# Patient Record
Sex: MALE | Employment: OTHER | URBAN - METROPOLITAN AREA
[De-identification: names, ages, dates, MRNs, and addresses within clinical notes are randomized per-mention and may not be internally consistent; named-entity substitution may affect disease eponyms.]

---

## 2017-03-15 ENCOUNTER — TELEPHONE (OUTPATIENT)
Dept: PULMONOLOGY | Facility: HOSPICE | Age: 72
End: 2017-03-15

## 2017-03-15 DIAGNOSIS — R06.00 DYSPNEA, UNSPECIFIED TYPE: ICD-10-CM

## 2017-05-08 ENCOUNTER — HOSPITAL ENCOUNTER (OUTPATIENT)
Dept: RADIOLOGY | Facility: MEDICAL CENTER | Age: 72
End: 2017-05-08

## 2017-05-08 ENCOUNTER — TELEPHONE (OUTPATIENT)
Dept: PULMONOLOGY | Facility: HOSPICE | Age: 72
End: 2017-05-08

## 2017-05-08 DIAGNOSIS — J40 BRONCHITIS: ICD-10-CM

## 2017-05-08 NOTE — TELEPHONE ENCOUNTER
New Pulmonary pt mariely 5/11/2017. Last CXR on record was done 10/19/2015 at [facility]( in PACS). Please sign order if exam appropriate for visit. Thank you!

## 2017-05-11 ENCOUNTER — OFFICE VISIT (OUTPATIENT)
Dept: PULMONOLOGY | Facility: HOSPICE | Age: 72
End: 2017-05-11
Payer: MEDICARE

## 2017-05-11 ENCOUNTER — NON-PROVIDER VISIT (OUTPATIENT)
Dept: PULMONOLOGY | Facility: HOSPICE | Age: 72
End: 2017-05-11
Payer: MEDICARE

## 2017-05-11 ENCOUNTER — APPOINTMENT (OUTPATIENT)
Dept: RADIOLOGY | Facility: IMAGING CENTER | Age: 72
End: 2017-05-11
Payer: MEDICARE

## 2017-05-11 VITALS
SYSTOLIC BLOOD PRESSURE: 130 MMHG | HEIGHT: 73 IN | WEIGHT: 200 LBS | DIASTOLIC BLOOD PRESSURE: 82 MMHG | HEART RATE: 66 BPM | TEMPERATURE: 98.4 F | RESPIRATION RATE: 16 BRPM | OXYGEN SATURATION: 92 % | BODY MASS INDEX: 26.51 KG/M2

## 2017-05-11 VITALS — HEIGHT: 73 IN | BODY MASS INDEX: 26.51 KG/M2 | WEIGHT: 200 LBS

## 2017-05-11 DIAGNOSIS — J44.9 CHRONIC OBSTRUCTIVE PULMONARY DISEASE, UNSPECIFIED COPD TYPE (HCC): ICD-10-CM

## 2017-05-11 DIAGNOSIS — J40 BRONCHITIS: ICD-10-CM

## 2017-05-11 DIAGNOSIS — R06.00 DYSPNEA, UNSPECIFIED TYPE: ICD-10-CM

## 2017-05-11 DIAGNOSIS — R09.02 HYPOXIA: ICD-10-CM

## 2017-05-11 PROCEDURE — 99204 OFFICE O/P NEW MOD 45 MIN: CPT | Mod: 25 | Performed by: INTERNAL MEDICINE

## 2017-05-11 PROCEDURE — 71020 DX-CHEST-2 VIEWS: CPT | Mod: 26 | Performed by: INTERNAL MEDICINE

## 2017-05-11 PROCEDURE — 94761 N-INVAS EAR/PLS OXIMETRY MLT: CPT | Performed by: INTERNAL MEDICINE

## 2017-05-11 PROCEDURE — 94060 EVALUATION OF WHEEZING: CPT | Performed by: INTERNAL MEDICINE

## 2017-05-11 PROCEDURE — 94729 DIFFUSING CAPACITY: CPT | Performed by: INTERNAL MEDICINE

## 2017-05-11 PROCEDURE — 1101F PT FALLS ASSESS-DOCD LE1/YR: CPT | Performed by: INTERNAL MEDICINE

## 2017-05-11 PROCEDURE — 94726 PLETHYSMOGRAPHY LUNG VOLUMES: CPT | Performed by: INTERNAL MEDICINE

## 2017-05-11 PROCEDURE — G8419 CALC BMI OUT NRM PARAM NOF/U: HCPCS | Performed by: INTERNAL MEDICINE

## 2017-05-11 RX ORDER — ALBUTEROL SULFATE 90 UG/1
2 AEROSOL, METERED RESPIRATORY (INHALATION) EVERY 4 HOURS PRN
Qty: 1 INHALER | Refills: 4 | Status: SHIPPED | OUTPATIENT
Start: 2017-05-11

## 2017-05-11 RX ORDER — IBUPROFEN 200 MG
200 TABLET ORAL EVERY 6 HOURS PRN
COMMUNITY

## 2017-05-11 RX ORDER — BUDESONIDE AND FORMOTEROL FUMARATE DIHYDRATE 160; 4.5 UG/1; UG/1
2 AEROSOL RESPIRATORY (INHALATION) 2 TIMES DAILY
Qty: 1 INHALER | Refills: 4 | Status: SHIPPED | OUTPATIENT
Start: 2017-05-11

## 2017-05-11 RX ORDER — PETROLATUM,WHITE/LANOLIN
OINTMENT (GRAM) TOPICAL
COMMUNITY

## 2017-05-11 ASSESSMENT — PULMONARY FUNCTION TESTS
FEV1_PREDICTED: 3.62
FEV1: 2.06
FEV1/FVC_PERCENT_PREDICTED: 74
FVC: 4.89
FEV1_PERCENT_CHANGE: 12
FEV1_PERCENT_PREDICTED: 56
FEV1/FVC: 37
FEV1: 1.79
FVC: 5.48
FEV1_PERCENT_PREDICTED: 49
FVC_PERCENT_PREDICTED: 111
FEV1_PERCENT_CHANGE: 14
FEV1/FVC_PERCENT_PREDICTED: 50
FEV1/FVC_PERCENT_CHANGE: 117
FVC_PERCENT_PREDICTED: 99
FEV1/FVC_PERCENT_PREDICTED: 49
FVC_PREDICTED: 4.92
FEV1/FVC: 37.59

## 2017-05-11 ASSESSMENT — PATIENT HEALTH QUESTIONNAIRE - PHQ9: CLINICAL INTERPRETATION OF PHQ2 SCORE: 0

## 2017-05-11 NOTE — PATIENT INSTRUCTIONS
This patient comes in at the request of his primary care specialist, Dr. Bull Carmona in [place] for evaluation of chronic obstructive pulmonary disease. Remarkably he has a very minimal cigarette smoking history, none for 45 years. However he has utilized marijuana, up to 3-4 times per day, indicates he uses it when stressed. This pattern is one of severe obstruction with hyperinflation. He is able to ski downhill, but any activity causes oxygen desaturation and dyspnea and shortness of breath. I suggested he add Symbicort to his program to try to improve the airflow, reduced to hyperinflation and we will measure spirometry at the next visit.    He does have seasonal allergies, no significant cough purulence or hemoptysis. His present oxygen is at 2 L at night, started empirically, I am qualifying him today with a walking oximetry as he lives at 8000 feet. He is considering moving lower.    With his marijuana history, and severe hyperinflation as well as severe airflow obstruction, I have urged him in the future when he develops pain or stress and previously would smoke marijuana, that he utilize oral sources. We will see him in 6 months with spirometry, initiate Symbicort, continue the use of the rescue inhaler, and avoid inhaled marijuana. Qualify for oxygen nighttime as well as exercise, 3 L at 8000 feet    Patient will need a flu vaccine annually and Prevnar this fall

## 2017-05-11 NOTE — PROGRESS NOTES
Shad Morales is a 71 y.o. male here for COPD and hypoxemia. Patient was referred by his primary care doctor.    History of Present Illness:      This patient comes in at the request of his primary care specialist, Dr. Bull Carmona in [place] for evaluation of chronic obstructive pulmonary disease. Remarkably he has a very minimal cigarette smoking history, none for 45 years. However he has utilized marijuana, up to 3-4 times per day, indicates he uses it when stressed. This pattern is one of severe obstruction with hyperinflation. He is able to ski downhill, but any activity causes oxygen desaturation and dyspnea and shortness of breath. I suggested he add Symbicort to his program to try to improve the airflow, reduced to hyperinflation and we will measure spirometry at the next visit.    He does have seasonal allergies, no significant cough purulence or hemoptysis. His present oxygen is at 2 L at night, started empirically, I am qualifying him today with a walking oximetry as he lives at 8000 feet. He is considering moving lower.    With his marijuana history, and severe hyperinflation as well as severe airflow obstruction, I have urged him in the future when he develops pain or stress and previously would smoke marijuana, that he utilize oral sources. We will see him in 6 months with spirometry, initiate Symbicort, continue the use of the rescue inhaler, and avoid inhaled marijuana. Qualify for oxygen nighttime as well as exercise, 3 L at 8000 feet    Constitutional ROS: No unexpected change in weight, No unexplained fevers, sweats, or chills  Eyes: No change in vision or blurring or double vision  Mouth/Throat ROS: No sore throat, No recent change in voice or hoarseness  Pulmonary ROS: See present history for pertinent positives  Cardiovascular ROS: No chest pain  Gastrointestinal ROS: No abdominal pain, No abdominal bloating or early satiety  Musculoskeletal/Extremities ROS: No clubbing, No  "cyanosis  Hematologic/Lymphatic ROS: No abnormal bleeding  Neurologic ROS: No weakness  Psychiatric ROS: No depression  Allergic/Immunologic: No rhinitis or urticaria     Current Outpatient Prescriptions   Medication Sig Dispense Refill   • ibuprofen (ADVIL) 200 MG Tab Take 200 mg by mouth every 6 hours as needed.     • Multiple Vitamin (MULTI VITAMIN DAILY PO) Take  by mouth.     • Misc Natural Products (GLUCOS-CHONDROIT-MSM COMPLEX) Tab Take  by mouth.     • budesonide-formoterol (SYMBICORT) 160-4.5 MCG/ACT Aerosol Inhale 2 Puffs by mouth 2 Times a Day. Use spacer. Rinse mouth after each use. 1 Inhaler 4   • albuterol (PROAIR HFA) 108 (90 BASE) MCG/ACT Aero Soln inhalation aerosol Inhale 2 Puffs by mouth every four hours as needed for Shortness of Breath (wheezing). 1 Inhaler 4     No current facility-administered medications for this visit.       Social History   Substance Use Topics   • Smoking status: Former Smoker -- 0.25 packs/day     Types: Cigarettes     Quit date: 01/01/1976   • Smokeless tobacco: Not on file   • Alcohol Use: Yes        Past Medical History   Diagnosis Date   • Back pain    • Kidney stone    • Chickenpox    • Latvian measles        Past Surgical History   Procedure Laterality Date   • Arthroscopy, knee     • Hernia repair         Allergies: Review of patient's allergies indicates not on file.    Family History   Problem Relation Age of Onset   • Diabetes Mother    • Asthma Father    • Asthma Sister        Physical Examination    Filed Vitals:    05/11/17 1346   Height: 1.854 m (6' 1\")   Weight: 90.719 kg (200 lb)   Weight % change since last entry.: 0 %   BP: 130/82   Pulse: 66   BMI (Calculated): 26.39   Resp: 16   Temp: 36.9 °C (98.4 °F)       General Appearance: alert, no distress  Skin: Skin color, texture, turgor normal. No rashes or lesions.  Eyes: negative  Oropharynx: Lips, mucosa, and tongue normal. Teeth and gums normal. Oropharynx moist and without lesion  Lungs: positive " findings: Markedly diminished with barrel chest  Heart: negative. RRR without murmur, gallop, or rubs.  No ectopy.  Abdomen: Abdomen soft, non-tender. BS normal. No masses,  No organomegaly  Extremities: Extremities normal. No deformities, edema, or skin discoloration  Peripheral Pulses: Normal  Neurologic: intact  No clubbing    II (soft palate, uvula, fauces visible)    Imaging: Described above    PFTS: Described above      Assessment and Plan  1. Chronic obstructive pulmonary disease, unspecified COPD type (CMS-[facility])  - DME O2 NEW SET UP  - AMB MULTIPLE OXIMETRY; Future  - budesonide-formoterol (SYMBICORT) 160-4.5 MCG/ACT Aerosol; Inhale 2 Puffs by mouth 2 Times a Day. Use spacer. Rinse mouth after each use.  Dispense: 1 Inhaler; Refill: 4  - albuterol (PROAIR HFA) 108 (90 BASE) MCG/ACT Aero Soln inhalation aerosol; Inhale 2 Puffs by mouth every four hours as needed for Shortness of Breath (wheezing).  Dispense: 1 Inhaler; Refill: 4    2. Hypoxia  - DME O2 NEW SET UP  - AMB MULTIPLE OXIMETRY; Future  - budesonide-formoterol (SYMBICORT) 160-4.5 MCG/ACT Aerosol; Inhale 2 Puffs by mouth 2 Times a Day. Use spacer. Rinse mouth after each use.  Dispense: 1 Inhaler; Refill: 4  - albuterol (PROAIR HFA) 108 (90 BASE) MCG/ACT Aero Soln inhalation aerosol; Inhale 2 Puffs by mouth every four hours as needed for Shortness of Breath (wheezing).  Dispense: 1 Inhaler; Refill: 4    This patient comes in at the request of his primary care specialist, Dr. Bull Carmona in [place] for evaluation of chronic obstructive pulmonary disease. Remarkably he has a very minimal cigarette smoking history, none for 45 years. However he has utilized marijuana, up to 3-4 times per day, indicates he uses it when stressed. This pattern is one of severe obstruction with hyperinflation. He is able to ski downhill, but any activity causes oxygen desaturation and dyspnea and shortness of breath. I suggested he add Symbicort to his program to try to  improve the airflow, reduced to hyperinflation and we will measure spirometry at the next visit.    He does have seasonal allergies, no significant cough purulence or hemoptysis. His present oxygen is at 2 L at night, started empirically, I am qualifying him today with a walking oximetry as he lives at 8000 feet. He is considering moving lower.    With his marijuana history, and severe hyperinflation as well as severe airflow obstruction, I have urged him in the future when he develops pain or stress and previously would smoke marijuana, that he utilize oral sources. We will see him in 6 months with spirometry, initiate Symbicort, continue the use of the rescue inhaler, and avoid inhaled marijuana. Qualify for oxygen nighttime as well as exercise, 3 L at 8000 feet  Followup Return in about 6 months (around 11/11/2017) for with Simple Spirometry, follow up visit with pulmonary physician.

## 2017-05-11 NOTE — MR AVS SNAPSHOT
"        Shad Morales   2017 1:00 PM   Non-Provider Visit   MRN: [id number]    Department:  [facility]   Dept Phone:  [phone]    Description:  Male : 1945   Provider:  Kassy Leger M.D.           Reason for Visit     Shortness of Breath           Allergies as of 2017     Not on File      You were diagnosed with     Dyspnea, unspecified type   [0576204]         Vital Signs     Height Weight Body Mass Index Smoking Status          1.854 m (6' 1\") 90.719 kg (200 lb) 26.39 kg/m2 Former Smoker        Basic Information     Date Of Birth Sex Race Ethnicity Preferred Language    1945 Male Unable to Obtain Unknown English      Health Maintenance        Date Due Completion Dates    IMM DTaP/Tdap/Td Vaccine (1 - Tdap) 1965 ---    COLONOSCOPY 1996 ---    IMM ZOSTER VACCINE 2006 ---    IMM PNEUMOCOCCAL 65+ (ADULT) LOW/MEDIUM RISK SERIES (1 of 2 - PCV13) 2011 ---            Current Immunizations     13-VALENT PCV PREVNAR 2016      Below and/or attached are the medications your provider expects you to take. Review all of your home medications and newly ordered medications with your provider and/or pharmacist. Follow medication instructions as directed by your provider and/or pharmacist. Please keep your medication list with you and share with your provider. Update the information when medications are discontinued, doses are changed, or new medications (including over-the-counter products) are added; and carry medication information at all times in the event of emergency situations     Allergies:  No Known Allergies          Medications  Valid as of: May 11, 2017 -  2:09 PM    Generic Name Brand Name Tablet Size Instructions for use    Ibuprofen (Tab) MOTRIN 200 MG Take 200 mg by mouth every 6 hours as needed.        Misc Natural Products (Tab) GLUCOS-CHONDROIT-MSM COMPLEX  Take  by mouth.        Multiple Vitamin   Take  by mouth.        .                 Medicines " prescribed today were sent to:     [id number] - [place], [place] - [address]    [address] [address]    Phone: [phone] Fax: [phone]    Open 24 Hours?: No      Medication refill instructions:       If your prescription bottle indicates you have medication refills left, it is not necessary to call your provider’s office. Please contact your pharmacy and they will refill your medication.    If your prescription bottle indicates you do not have any refills left, you may request refills at any time through one of the following ways: The online Qreativ Studio system (except Urgent Care), by calling your provider’s office, or by asking your pharmacy to contact your provider’s office with a refill request. Medication refills are processed only during regular business hours and may not be available until the next business day. Your provider may request additional information or to have a follow-up visit with you prior to refilling your medication.   *Please Note: Medication refills are assigned a new Rx number when refilled electronically. Your pharmacy may indicate that no refills were authorized even though a new prescription for the same medication is available at the pharmacy. Please request the medicine by name with the pharmacy before contacting your provider for a refill.           Q[employer] Studio Access Code: [email or web address]  Expires: 6/10/2017  1:02 PM    Your email address is not on file at [facility].  Email Addresses are required for you to sign up for [employer] Studio, please contact [phone] to verify your personal information and to provide your email address prior to attempting to register for Qreativ Studio.    Shad Morales  [address] [place] [address] [address]  [place] [address]    Qreativ Studio  A secure, online tool to manage your health information     [facility]’s Qreativ Studio® is a secure, online tool that connects you to your personalized health information from the privacy  of your home -- day or night - making it very easy for you to manage your healthcare. Once the activation process is completed, you can even access your medical information using the SnowGate hailey, which is available for free in the Apple Hailey store or Google Play store.     To learn more about SnowGate, visit [email or web address]    There are two levels of access available (as shown below):   My Chart Features  [facility] Doctor [facility]  Specialists [facility]  Urgent  Care Non-[facility] Primary Care Doctor   Email your healthcare team securely and privately 24/7 X X X    Manage appointments: schedule your next appointment; view details of past/upcoming appointments X      Request prescription refills. X      View recent personal medical records, including lab and immunizations X X X X   View health record, including health history, allergies, medications X X X X   Read reports about your outpatient visits, procedures, consult and ER notes X X X X   See your discharge summary, which is a recap of your hospital and/or ER visit that includes your diagnosis, lab results, and care plan X X  X     How to register for SnowGate:  Once your e-mail address has been verified, follow the following steps to sign up for SnowGate.     1. Go to  [email or web address]  2. Click on the Sign Up Now box, which takes you to the New Member Sign Up page. You will need to provide the following information:  a. Enter your SnowGate Access Code exactly as it appears at the top of this page. (You will not need to use this code after you’ve completed the sign-up process. If you do not sign up before the expiration date, you must request a new code.)   b. Enter your date of birth.   c. Enter your home email address.   d. Click Submit, and follow the next screen’s instructions.  3. Create a SnowGate ID. This will be your SnowGate login ID and cannot be changed, so think of one that is secure and easy to remember.  4. Create a SnowGate password.  You can change your password at any time.  5. Enter your Password Reset Question and Answer. This can be used at a later time if you forget your password.   6. Enter your e-mail address. This allows you to receive e-mail notifications when new information is available in Tailored.  7. Click Sign Up. You can now view your health information.    For assistance activating your Tailored account, call [phone]

## 2017-05-11 NOTE — MR AVS SNAPSHOT
"        Shad Morales   2017 2:00 PM   Office Visit   MRN: [id number]    Department:  [facility]   Dept Phone:  [phone]    Description:  Male : 1945   Provider:  Kassy Leger M.D.           Reason for Visit     New Patient Dyspnea      Allergies as of 2017     Not on File      You were diagnosed with     Chronic obstructive pulmonary disease, unspecified COPD type (CMS-HCC)   [7413992]       Hypoxia   [260552]         Vital Signs     Blood Pressure Pulse Temperature Respirations Height Weight    130/82 mmHg 66 36.9 °C (98.4 °F) 16 1.854 m (6' 0.99\") 90.719 kg (200 lb)    Body Mass Index Oxygen Saturation Smoking Status             26.39 kg/m2 92% Former Smoker         Basic Information     Date Of Birth Sex Race Ethnicity Preferred Language    1945 Male Unable to Obtain Unknown English      Problem List              ICD-10-CM Priority Class Noted - Resolved    Hypoxia R09.02   2017 - Present    Chronic obstructive pulmonary disease (CMS-HCC) J44.9   2017 - Present      Health Maintenance        Date Due Completion Dates    IMM DTaP/Tdap/Td Vaccine (1 - Tdap) 1965 ---    COLONOSCOPY 1996 ---    IMM ZOSTER VACCINE 2006 ---    IMM PNEUMOCOCCAL 65+ (ADULT) LOW/MEDIUM RISK SERIES (1 of 2 - PCV13) 2011 ---            Current Immunizations     13-VALENT PCV PREVNAR 2016      Below and/or attached are the medications your provider expects you to take. Review all of your home medications and newly ordered medications with your provider and/or pharmacist. Follow medication instructions as directed by your provider and/or pharmacist. Please keep your medication list with you and share with your provider. Update the information when medications are discontinued, doses are changed, or new medications (including over-the-counter products) are added; and carry medication information at all times in the event of emergency situations     Allergies:  No Known " Allergies          Medications  Valid as of: May 11, 2017 -  2:50 PM    Generic Name Brand Name Tablet Size Instructions for use    Albuterol Sulfate (Aero Soln) albuterol 108 (90 BASE) MCG/ACT Inhale 2 Puffs by mouth every four hours as needed for Shortness of Breath (wheezing).        Budesonide-Formoterol Fumarate (Aerosol) SYMBICORT 160-4.5 MCG/ACT Inhale 2 Puffs by mouth 2 Times a Day. Use spacer. Rinse mouth after each use.        Ibuprofen (Tab) MOTRIN 200 MG Take 200 mg by mouth every 6 hours as needed.        Misc Natural Products (Tab) GLUCOS-CHONDROIT-MSM COMPLEX  Take  by mouth.        Multiple Vitamin   Take  by mouth.        .                 Medicines prescribed today were sent to:     [id number] - [address] [address]    Phone: [phone] Fax: [phone]    Open 24 Hours?: No      Medication refill instructions:       If your prescription bottle indicates you have medication refills left, it is not necessary to call your provider’s office. Please contact your pharmacy and they will refill your medication.    If your prescription bottle indicates you do not have any refills left, you may request refills at any time through one of the following ways: The online Meeps system (except Urgent Care), by calling your provider’s office, or by asking your pharmacy to contact your provider’s office with a refill request. Medication refills are processed only during regular business hours and may not be available until the next business day. Your provider may request additional information or to have a follow-up visit with you prior to refilling your medication.   *Please Note: Medication refills are assigned a new Rx number when refilled electronically. Your pharmacy may indicate that no refills were authorized even though a new prescription for the same medication is available at the pharmacy. Please request the medicine by name  with the pharmacy before contacting your provider for a refill.        Your To Do List     Future Labs/Procedures Complete By Expires    AMB MULTIPLE OXIMETRY  As directed 5/11/2018      Instructions    This patient comes in at the request of his primary care specialist, Dr. Bull Carmona in [place] for evaluation of chronic obstructive pulmonary disease. Remarkably he has a very minimal cigarette smoking history, none for 45 years. However he has utilized marijuana, up to 3-4 times per day, indicates he uses it when stressed. This pattern is one of severe obstruction with hyperinflation. He is able to ski downhill, but any activity causes oxygen desaturation and dyspnea and shortness of breath. I suggested he add Symbicort to his program to try to improve the airflow, reduced to hyperinflation and we will measure spirometry at the next visit.    He does have seasonal allergies, no significant cough purulence or hemoptysis. His present oxygen is at 2 L at night, started empirically, I am qualifying him today with a walking oximetry as he lives at 8000 feet. He is considering moving lower.    With his marijuana history, and severe hyperinflation as well as severe airflow obstruction, I have urged him in the future when he develops pain or stress and previously would smoke marijuana, that he utilize oral sources. We will see him in 6 months with spirometry, initiate Symbicort, continue the use of the rescue inhaler, and avoid inhaled marijuana. Qualify for oxygen nighttime as well as exercise, 3 L at 8000 feet    Patient will need a flu vaccine annually and Prevnar this fall          [employer] Access Code: [email or web address]  Expires: 6/10/2017  1:02 PM    Your email address is not on file at [facility].  Email Addresses are required for you to sign up for [employer], please contact [phone] to verify your personal information and to provide your email address prior to attempting to register for [employer].    Shad CAMPA  [place]  [address]  [place] [address]    [employer]ost[employer]  A secure, online tool to manage your health information     [employer]’s Sentrigot® is a secure, online tool that connects you to your personalized health information from the privacy of your home -- day or night - making it very easy for you to manage your healthcare. Once the activation process is completed, you can even access your medical information using the [email or web address] hailey, which is available for free in the Apple Haiely store or Google Play store.     To learn more about [email or web address], visit [email or web address]    There are two levels of access available (as shown below):   My Chart Features  [facility] Primary Care Doctor [facility]  Specialists [facility]  Urgent  Care Non-[facility] Primary Care Doctor   Email your healthcare team securely and privately 24/7 X X X    Manage appointments: schedule your next appointment; view details of past/upcoming appointments X      Request prescription refills. X      View recent personal medical records, including lab and immunizations X X X X   View health record, including health history, allergies, medications X X X X   Read reports about your outpatient visits, procedures, consult and ER notes X X X X   See your discharge summary, which is a recap of your hospital and/or ER visit that includes your diagnosis, lab results, and care plan X X  X     How to register for [email or web address]:  Once your e-mail address has been verified, follow the following steps to sign up for [email or web address].     1. Go to  [email or web address]  2. Click on the Sign Up Now box, which takes you to the New Member Sign Up page. You will need to provide the following information:  a. Enter your [email or web address] Access Code exactly as it appears at the top of this page. (You will not need to use this code after you’ve completed the sign-up process. If you do not sign up before the expiration date, you must request a new code.)   b. Enter your date of birth.    c. Enter your home email address.   d. Click Submit, and follow the next screen’s instructions.  3. Create a PayEaset ID. This will be your NavTech login ID and cannot be changed, so think of one that is secure and easy to remember.  4. Create a PayEaset password. You can change your password at any time.  5. Enter your Password Reset Question and Answer. This can be used at a later time if you forget your password.   6. Enter your e-mail address. This allows you to receive e-mail notifications when new information is available in NavTech.  7. Click Sign Up. You can now view your health information.    For assistance activating your [employer]Tech account, call [phone]

## 2017-05-11 NOTE — PROCEDURES
Technician: TEA Bhagat    Technician Comment:  Good patient effort & cooperation.  The results of this test meet the ATS/ERS standards for acceptability & reproducibility.  Test was performed on the [employer] Body Plethysmograph-Elite DX system.  Predicted values were [facility] for spirometry, [facility] for DLCO, ITS for Lung Volumes.  The DLCO was uncorrected for Hgb.  A bronchodilator of Ventolin HFA -2puffs via spacer administered.    Interpretation:    Marked reduction of mid flows to 25% predicted. Bronchodilator response. Moderate hyperinflation present. Preservation of oxygen transfer. Good effort noted. Flow volume loop confirms the severe obstructive pattern

## 2024-12-13 ENCOUNTER — TELEPHONE (OUTPATIENT)
Dept: HEALTH INFORMATION MANAGEMENT | Facility: OTHER | Age: 79
End: 2024-12-13
Payer: MEDICARE